# Patient Record
(demographics unavailable — no encounter records)

---

## 2024-10-09 NOTE — HISTORY OF PRESENT ILLNESS
[FreeTextEntry1] : Ms. De Jesus presents for follow up and management of MOREL, chest pain, and palpitations.  She has mild lower extremity edema.  She has complaints of occasional palpitations.  She was evaluated by a pulmonologist at [facility] and had a normal work up (but the records are not available for my review). On 03/14/23, she had an echocardiogram which revealed an EF of 64% and was a normal study. She wore a mobile cardiac telemetry monitor from 03/05/23 to 03/25/23 which revealed HR 62/81/120, no AF, no SVT, no pauses, no block, no VT, 0% APCs, 0% PVCs, and 2 patient triggered events for SR.  She has limited her intake of fast food and is looking to start a regular exercise regimen in the near future. She admits to snoring and occasional apnea episodes.  She admits to significant dietary indiscretion regarding ice cream.

## 2024-10-09 NOTE — ASSESSMENT
[FreeTextEntry1] : ======================================================================================= 1. Anemia, microcytic, ? sickle cell trait: iron 28, Hgb 10.3, MCV 78 (04/02/24):     - follow up with PMD   2. Chest pain:  s/p normal cardiac work up: resolved:       - will pursue conservative management at this time  3. Palpitations: likely secondary to ectopy: s/p 03/05/23 to 03/25/23: [employer] MCT: HR 62/81/120, no AF, no SVT, no pauses, no block, no VT, 0% APCs, 0% PVCs,  2 patient triggered events for SR:       - will pursue conservative management at this time  4. Borderline HTN: BP at ACC/AHA 2017 guideline target: ? component of "white-coat" HTN:      - she will maintain a home BP Log for my review next visit       - if BP remains above target next visit will initiate an anti-HTN regimen   5. Overweight: BMI 35.0:        - we had an extensive discussion about therapeutic lifestyle changes to promote increased cardiovascular fitness and achieve goal weight

## 2024-10-09 NOTE — REASON FOR VISIT
[FreeTextEntry1] : ======================================================================================= Diagnostic Tests: -------------------------------------------------------------- EC24: sinus rhythm, normal ECG.  23: sinus rhythm, normal ECG.  23: sinus rhythm, normal ECG.  -------------------------------------------------------------- Echo: 23: EF 64%, normal study.  -------------------------------------------------------------- Stress tests: 23: ETT: 7.1 METS, no angina, nonspecific ST changes.  -------------------------------------------------------------- Monitors: 23 to 23: [employer] MCT: HR 62/81/120, no AF, no SVT, no pauses, no block, no VT, 0% APCs, 0% PVCs,  2 patient triggered events for SR.  -------------------------------------------------------------- Sleep studies: 07/10/24: HST: near normal.

## 2024-12-03 NOTE — HISTORY OF PRESENT ILLNESS
[FreeTextEntry1] : Ms. De Jesus presents for follow up and management of pre-DM2, MOREL, chest pain, and palpitations.  She has mild lower extremity edema.  She has complaints of occasional palpitations.  She was evaluated by a pulmonologist at [facility] and had a normal work up (but the records are not available for my review). On 03/14/23, she had an echocardiogram which revealed an EF of 64% and was a normal study. She wore a mobile cardiac telemetry monitor from 03/05/23 to 03/25/23 which revealed HR 62/81/120, no AF, no SVT, no pauses, no block, no VT, 0% APCs, 0% PVCs, and 2 patient triggered events for SR.  She has lost about 8 pounds since last visit.  We had an extensive discussion about therapeutic lifestyle changes to improve A1c.

## 2024-12-03 NOTE — REASON FOR VISIT
[FreeTextEntry1] : ======================================================================================= Diagnostic Tests: -------------------------------------------------------------- EC24: sinus rhythm, normal ECG.  24: sinus rhythm, normal ECG.  23: sinus rhythm, normal ECG.  23: sinus rhythm, normal ECG.  -------------------------------------------------------------- Echo: 23: EF 64%, normal study.  -------------------------------------------------------------- Stress tests: 23: ETT: 7.1 METS, no angina, nonspecific ST changes.  -------------------------------------------------------------- Monitors: 23 to 23: [employer] MCT: HR 62/81/120, no AF, no SVT, no pauses, no block, no VT, 0% APCs, 0% PVCs,  2 patient triggered events for SR.  -------------------------------------------------------------- Sleep studies: 07/10/24: HST: near normal.

## 2024-12-03 NOTE — ASSESSMENT
[FreeTextEntry1] : ======================================================================================= 1. Anemia, microcytic, ? sickle cell trait: iron 28, Hgb 10.3, MCV 78 (04/02/24):     - follow up with PMD      - patient will provide copy of recent lab work from PMD's office for my review  2. Chest pain:  s/p normal cardiac work up: resolved:       - will pursue conservative management at this time  3. Palpitations: likely secondary to ectopy: s/p 03/05/23 to 03/25/23: [employer] MCT: HR 62/81/120, no AF, no SVT, no pauses, no block, no VT, 0% APCs, 0% PVCs,  2 patient triggered events for SR:       - will pursue conservative management at this time  4. Borderline HTN: BP at ACC/AHA 2017 guideline target: ? component of "white-coat" HTN:      - she will maintain a home BP log for my review next visit       - continue off anti-HTN medications at this time   5. Overweight: BMI 35.0:        - we had an extensive discussion about therapeutic lifestyle changes to promote increased cardiovascular fitness and achieve goal weight   6. Pre-DM2: A1c 5.9% (09/05/24):       - discussed with patient therapeutic lifestyle changes to improve glucose metabolism

## 2025-06-10 NOTE — HISTORY OF PRESENT ILLNESS
[FreeTextEntry1] : Ms. De Jesus presents for follow up and management of pre-DM2, overweight, chronic anemia.  She has mild lower extremity edema.  She has complaints of occasional palpitations.  She was evaluated by a pulmonologist at [facility] and had a normal work up (but the records are not available for my review). On 03/14/23, she had an echocardiogram which revealed an EF of 64% and was a normal study. She wore a mobile cardiac telemetry monitor from 03/05/23 to 03/25/23 which revealed HR 62/81/120, no AF, no SVT, no pauses, no block, no VT, 0% APCs, 0% PVCs, and 2 patient triggered events for SR.  We had an extensive discussion about therapeutic lifestyle changes to improve A1c.   On 05/29/25, she had complaints of atypical chest pain lasting several hours.  The following day, 05/30/25, while at her graduation from [employer], she had recurrence of the chest pain again lasting for several hours.   The subsequent day, 06/01/25, she went to the ED at [facility] where she had a normal work up and was sent home.  At present, she has continued brief chest pressure episodes.  We discussed the possible differential diagnosis of her symptoms and agreed to pursue presumptive treatment for likely GERD.

## 2025-06-10 NOTE — HISTORY OF PRESENT ILLNESS
[FreeTextEntry1] : Ms. De Jesus presents for follow up and management of pre-DM2, overweight, chronic anemia.  She has mild lower extremity edema.  She has complaints of occasional palpitations.  She was evaluated by a pulmonologist at [facility] and had a normal work up (but the records are not available for my review). On 03/14/23, she had an echocardiogram which revealed an EF of 64% and was a normal study. She wore a mobile cardiac telemetry monitor from 03/05/23 to 03/25/23 which revealed HR 62/81/120, no AF, no SVT, no pauses, no block, no VT, 0% APCs, 0% PVCs, and 2 patient triggered events for SR.  We had an extensive discussion about therapeutic lifestyle changes to improve A1c.   On 05/29/25, she had complaints of atypical chest pain lasting several hours.  The following day, 05/30/25, while at her graduation from [employer], she had recurrence of the chest pain again lasting for several hours.   The subsequent day, 06/01/25, she went to the ED at [facility] where she had a normal work up and was sent home.  At present, she has continued brief chest pressure episodes.  We discussed the possible differential diagnosis of her symptoms and agreed to pursue presumptive treatment for likely GERD.

## 2025-06-10 NOTE — ASSESSMENT
[FreeTextEntry1] : ======================================================================================= 1. Anemia, microcytic, ? sickle cell trait: Hgb 10.1 (03/15/25):   - follow up with PMD   - will recheck CBC and iron studies next visit and refer to hematologist   2. Chest pain:  s/p normal cardiac work up: resolved:   - will pursue conservative management at this time  3. Palpitations: likely secondary to ectopy: s/p 03/05/23 to 03/25/23: [employer] MCT: HR 62/81/120, no AF, no SVT, no pauses, no block, no VT, 0% APCs, 0% PVCs,  2 patient triggered events for SR:   - will pursue conservative management at this time  4. Borderline HTN: BP at ACC/AHA 2017 guideline target: ? component of "white-coat" HTN:  - she will maintain a home BP log for my review next visit   - continue off anti-HTN medications at this time   5. Overweight: BMI 34.2: + weight gain:    - we had an extensive discussion about therapeutic lifestyle changes to promote increased cardiovascular fitness and achieve goal weight   6. Pre-DM2: A1c 5.9% (09/05/24):   - discussed with patient therapeutic lifestyle changes to improve glucose metabolism  7. GERD:   - will pursue a trial of GERD therapy with famotidine 40mg po qhs   I spent > 60 minutes of total time on this visit, including time spent face-to-face and non-face-to-face.  During this time, I took a relevant history and examined the patient.  I reviewed relevant portions of the medical record and formulated a differential diagnosis and plan.  I explained the relevant cardiac diagnoses to the patient, as well as the work up and management plan.  I answered all questions related to the patient's cardiac conditions.

## 2025-06-10 NOTE — REASON FOR VISIT
[Other: ____] : [unfilled] [FreeTextEntry1] : ======================================================================================= Diagnostic Tests: -------------------------------------------------------------- EC/10/25: sinus rhythm, normal ECG.  24: sinus rhythm, normal ECG.  24: sinus rhythm, normal ECG.  23: sinus rhythm, normal ECG.  23: sinus rhythm, normal ECG.  -------------------------------------------------------------- Echo: 23: EF 64%, normal study.  -------------------------------------------------------------- Stress tests: 23: ETT: 7.1 METS, no angina, nonspecific ST changes.  -------------------------------------------------------------- Cardiac rhythm monitors: 23 to 23: [employer] MCT: HR 62/81/120, no AF, no SVT, no pauses, no block, no VT, 0% APCs, 0% PVCs,  2 patient triggered events for SR.  -------------------------------------------------------------- Sleep studies: 07/10/24: HST: near normal.

## 2025-06-10 NOTE — REVIEW OF SYSTEMS
[Negative] : Heme/Lymph [Dyspnea on exertion] : dyspnea during exertion [Chest Discomfort] : chest discomfort [Heartburn] : heartburn

## 2025-06-10 NOTE — REVIEW OF SYSTEMS
[Negative] : Heme/Lymph [Chest Discomfort] : chest discomfort [Dyspnea on exertion] : dyspnea during exertion [Heartburn] : heartburn